# Patient Record
Sex: FEMALE | Race: WHITE | NOT HISPANIC OR LATINO | URBAN - METROPOLITAN AREA
[De-identification: names, ages, dates, MRNs, and addresses within clinical notes are randomized per-mention and may not be internally consistent; named-entity substitution may affect disease eponyms.]

---

## 2022-04-12 ENCOUNTER — PRE-PROCEDURE SCREENING (OUTPATIENT)
Dept: GASTROENTEROLOGY | Facility: CLINIC | Age: 47
End: 2022-04-12

## 2022-04-12 NOTE — TELEPHONE ENCOUNTER
Colonoscopy screening--No personal history  of polyps--Family history of polyps (mother)--Family history of colon cancer-- (grandmother)--No blood thinners--Medications:          Losartan  Sertraline               Pt verbalized and understood that it would be few weeks before been schedule            Pt will call back with update insurance

## 2022-04-13 ENCOUNTER — PREP FOR SURGERY (OUTPATIENT)
Dept: OTHER | Facility: HOSPITAL | Age: 47
End: 2022-04-13

## 2022-04-13 DIAGNOSIS — Z12.11 SCREENING FOR MALIGNANT NEOPLASM OF COLON: Primary | ICD-10-CM

## 2022-04-13 RX ORDER — SODIUM CHLORIDE, SODIUM LACTATE, POTASSIUM CHLORIDE, CALCIUM CHLORIDE 600; 310; 30; 20 MG/100ML; MG/100ML; MG/100ML; MG/100ML
30 INJECTION, SOLUTION INTRAVENOUS CONTINUOUS
Status: CANCELLED | OUTPATIENT
Start: 2022-04-13

## 2022-05-18 ENCOUNTER — TELEPHONE (OUTPATIENT)
Dept: GASTROENTEROLOGY | Facility: CLINIC | Age: 47
End: 2022-05-18

## 2022-05-18 NOTE — TELEPHONE ENCOUNTER
Received call from patient states she hasn't heard back to complete questionnaire.  Completed over the phone with her and put in Angelica box to be processed to Dr Bhakta.

## 2022-06-01 ENCOUNTER — TELEPHONE (OUTPATIENT)
Dept: GASTROENTEROLOGY | Facility: CLINIC | Age: 47
End: 2022-06-01

## 2022-06-01 ENCOUNTER — PREP FOR SURGERY (OUTPATIENT)
Dept: OTHER | Facility: HOSPITAL | Age: 47
End: 2022-06-01

## 2022-06-01 DIAGNOSIS — Z83.71 FAMILY HISTORY OF POLYPS IN THE COLON: Primary | ICD-10-CM

## 2022-06-01 PROBLEM — Z83.719 FAMILY HISTORY OF POLYPS IN THE COLON: Status: ACTIVE | Noted: 2022-06-01

## 2022-06-01 NOTE — TELEPHONE ENCOUNTER
belen Wilkes for 8/19 arrive at 915/,  mailing out prep inst on 6/1 advised pt time is subject to change [facility] will call.

## 2022-08-19 ENCOUNTER — TELEPHONE (OUTPATIENT)
Dept: GASTROENTEROLOGY | Facility: CLINIC | Age: 47
End: 2022-08-19

## 2022-08-19 NOTE — TELEPHONE ENCOUNTER
RAHEEL patient via telephone for   . Scheduled 11/4/22 with arrival time of 9:06 am . Prep paperwork mailed to verified address on file. Patient advised arrival time may change based on [facility] guidelines. RAHEEL QUEVEDO

## 2022-08-19 NOTE — TELEPHONE ENCOUNTER
Caller: Audrey Rodriguez    Relationship to patient: Self    Best call back number: [phone]   Chief complaint: COVID     Type of visit: COLONOSCOPY    Requested date: NEXT AVAILABLE     If rescheduling, when is the original appointment:08/19/22     Additional notes:  PATIENT CALLED IN ASKING TO RESCHEDULE COLONOSCOPY DUE TO COVID.

## 2022-10-24 ENCOUNTER — TELEPHONE (OUTPATIENT)
Dept: GASTROENTEROLOGY | Facility: CLINIC | Age: 47
End: 2022-10-24

## 2022-10-24 NOTE — TELEPHONE ENCOUNTER
Caller: Audrey Rodriguez    Relationship to patient: Self    Best call back number: [phone]    Chief complaint: SCREENING    Type of visit: COLONOSCOPY     Requested date: NEXT AVAILABLE ON A Friday OR Monday.    If rescheduling, when is the original appointment: 11/4/2022    Additional notes: PT WILL HAVE NOT HAVE A RIDE FOR 11/4.

## 2022-10-27 NOTE — TELEPHONE ENCOUNTER
RAHEEL Clarke for COLONOSCOPY  on 12/07/22 arrive at 10am.Prep instructions given to pt at office.

## 2022-12-06 ENCOUNTER — TELEPHONE (OUTPATIENT)
Dept: GASTROENTEROLOGY | Facility: CLINIC | Age: 47
End: 2022-12-06

## 2022-12-06 NOTE — TELEPHONE ENCOUNTER
Caller: Audrey Rodriguez    Relationship to patient: Self    Best call back number: [phone]    Patient is needing: PATIENT HAS COLONOSCOPY TOMORROW WITH DR WILEY. PATIENTS FAMILY IS SICK WITH THE FLU AND SHE IS STARTING TO NOT FEEL WELL. SHE WOULD LIKE A CALLBACK TO RESCHEDULE APPT.

## 2022-12-09 NOTE — TELEPHONE ENCOUNTER
RAHEEL Wilkes for COLONOSCOPY  on 1/20/23 arrive at 830am.Prep instructions mailed to address on file.

## 2023-01-16 ENCOUNTER — TELEPHONE (OUTPATIENT)
Dept: GASTROENTEROLOGY | Facility: CLINIC | Age: 48
End: 2023-01-16

## 2023-01-16 ENCOUNTER — TELEPHONE (OUTPATIENT)
Dept: GASTROENTEROLOGY | Facility: CLINIC | Age: 48
End: 2023-01-16
Payer: COMMERCIAL

## 2023-01-16 NOTE — TELEPHONE ENCOUNTER
OK FOR HUB TO READ  RAHEEL patient via telephone for COLONOSCOPY. Scheduled 2/117/23 with arrival time of 0730AM. Prep paperwork mailed to verified address on file. Patient advised arrival time may change based on [facility] guidelines. RAHEEL RUGGIERO

## 2023-01-16 NOTE — TELEPHONE ENCOUNTER
Caller: Audrey Rodriguez    Relationship to patient: Self    Best call back number: [phone]    Patient is needing: PATIENT IS NEEDING TO RESCHEDULE SCOPE, PREFERS Friday'S AND ANYTIME AFTER  02/17/23.

## 2023-01-16 NOTE — TELEPHONE ENCOUNTER
Caller: Audrey Rodriguez    Relationship to patient: Self    Best call back number: [phone]    Type of visit: RESCHEDULE COLONOSCOPY.    Requested date: NEXT AVAILABLE     If rescheduling, when is the original appointment: 1/20/23    Additional notes:PATIENT CALLED IN ASKING TO RESCHEDULE COLONOSCOPY.

## 2023-01-17 ENCOUNTER — TELEPHONE (OUTPATIENT)
Dept: GASTROENTEROLOGY | Facility: CLINIC | Age: 48
End: 2023-01-17
Payer: COMMERCIAL

## 2023-01-17 NOTE — TELEPHONE ENCOUNTER
OK FOR HUB TO READ  RAHEEL patient via telephone for COLONOSCOPY. Scheduled 2/24/23 with arrival time of 1030AM. Prep paperwork mailed to verified address on file. Patient advised arrival time may change based on [facility] guidelines. RAHEEL RUGGIERO

## 2023-02-20 ENCOUNTER — TELEPHONE (OUTPATIENT)
Dept: GASTROENTEROLOGY | Facility: CLINIC | Age: 48
End: 2023-02-20

## 2023-02-20 NOTE — TELEPHONE ENCOUNTER
Caller: Audrey Rodriguez    Relationship to patient: Self    Best call back number: [phone]    Type of visit: COLONOSCOPY    Requested date: SOMETIME IN APRIL    If rescheduling, when is the original appointment: 2/24/23    Additional notes:PT HAS ANOTHER PROCEDURE THIS WEEK AND NEEDS TO RESCHEDULE. CALL BACK ANYTIME AND OK TO [facility] and the [facility].

## 2023-02-21 ENCOUNTER — TELEPHONE (OUTPATIENT)
Dept: GASTROENTEROLOGY | Facility: CLINIC | Age: 48
End: 2023-02-21
Payer: COMMERCIAL

## 2023-04-04 ENCOUNTER — TELEPHONE (OUTPATIENT)
Dept: GASTROENTEROLOGY | Facility: CLINIC | Age: 48
End: 2023-04-04

## 2023-04-04 NOTE — TELEPHONE ENCOUNTER
Hub staff attempted to follow warm transfer process and was unsuccessful     Caller: Audrey Rodriguez    Relationship to patient: Self    Best call back number: [phone]    Patient is needing: PATIENT STATES THAT SHE HAS HAD TO RESCHEDULE HER SCOPE MULTIPLE TIMES AND BEFORE SHE RESCHEDULES AGAIN SHE WANTS TO KNOW IF DR WILEY WOULD WANT TO SEE HER IN THE OFFICE FIRST; SHE STATES THAT SHE HAS HAD HEMORRHOIDS OFF AND ON AND THAT RECENTLY WHEN SHE HAS BEEN WIPING AFTER A BOWEL MOVEMENT, SHE IS SEEING BRIGHT RED BLOOD. PLEASE CALL PATIENT BACK TO ADVISE.

## 2023-04-21 ENCOUNTER — TELEPHONE (OUTPATIENT)
Dept: GASTROENTEROLOGY | Facility: CLINIC | Age: 48
End: 2023-04-21

## 2023-06-14 ENCOUNTER — OFFICE VISIT (OUTPATIENT)
Dept: GASTROENTEROLOGY | Facility: CLINIC | Age: 48
End: 2023-06-14
Payer: COMMERCIAL

## 2023-06-14 VITALS
SYSTOLIC BLOOD PRESSURE: 128 MMHG | BODY MASS INDEX: 21.76 KG/M2 | DIASTOLIC BLOOD PRESSURE: 81 MMHG | HEIGHT: 66 IN | TEMPERATURE: 97.3 F | OXYGEN SATURATION: 100 % | HEART RATE: 78 BPM | WEIGHT: 135.4 LBS

## 2023-06-14 DIAGNOSIS — K59.00 CONSTIPATION, UNSPECIFIED CONSTIPATION TYPE: Primary | ICD-10-CM

## 2023-06-14 PROCEDURE — 99204 OFFICE O/P NEW MOD 45 MIN: CPT | Performed by: INTERNAL MEDICINE

## 2023-06-14 RX ORDER — LOSARTAN POTASSIUM 50 MG/1
1 TABLET ORAL EVERY 12 HOURS SCHEDULED
COMMUNITY
Start: 2023-04-17

## 2023-06-14 RX ORDER — SERTRALINE HYDROCHLORIDE 100 MG/1
1 TABLET, FILM COATED ORAL DAILY
COMMUNITY
Start: 2023-04-17

## 2023-06-14 RX ORDER — DIPHENOXYLATE HYDROCHLORIDE AND ATROPINE SULFATE 2.5; .025 MG/1; MG/1
TABLET ORAL
COMMUNITY

## 2023-06-14 RX ORDER — DIMENHYDRINATE 50 MG
1 TABLET ORAL
COMMUNITY

## 2023-06-14 RX ORDER — SODIUM CHLORIDE, SODIUM LACTATE, POTASSIUM CHLORIDE, CALCIUM CHLORIDE 600; 310; 30; 20 MG/100ML; MG/100ML; MG/100ML; MG/100ML
30 INJECTION, SOLUTION INTRAVENOUS CONTINUOUS
OUTPATIENT
Start: 2023-06-14

## 2023-06-14 RX ORDER — VITAMIN B COMPLEX
1 CAPSULE ORAL
COMMUNITY

## 2023-06-14 RX ORDER — DAPSONE 50 MG/G
GEL TOPICAL
COMMUNITY
Start: 2023-03-01

## 2023-06-14 NOTE — PROGRESS NOTES
Chief Complaint   Patient presents with    Constipation    Hematochezia    Abdominal Cramping     Subjective   HPI  Audrey Rodriguez is a 48 y.o. female who presents today for new patient evaluation.    She is here to discuss need for screening colonoscopy.  She has a family history of colon cancer in her maternal grandmother and polyps in her mother.  She reports she had a few episodes of red blood noted on the tissue with wiping over the last few weeks.  She did have a few weeks of transient constipation preceding this.  Feels like her bowels are currently at baseline.  No prior colonoscopy.    There is some question of a history of von Willebrand's disease which was brought up after she had an episode of bleeding 2 weeks after tonsillectomy at the age of 20.  She tells me she had a's extensive evaluation by hematology about 2 years ago and was not found to have any specific bleeding disorder.      Objective   Vitals:    06/14/23 0819   BP: 128/81   Pulse: 78   Temp: 97.3 °F (36.3 °C)   SpO2: 100%     Physical Exam  Vitals reviewed.   Constitutional:       Appearance: She is well-developed.   HENT:      Head: Normocephalic and atraumatic.   Abdominal:      General: Bowel sounds are normal. There is no distension.      Palpations: Abdomen is soft. There is no mass.      Tenderness: There is no abdominal tenderness.      Hernia: No hernia is present.   Skin:     General: Skin is warm and dry.   Neurological:      Mental Status: She is alert and oriented to person, place, and time.   Psychiatric:         Behavior: Behavior normal.         Thought Content: Thought content normal.         Judgment: Judgment normal.            Assessment & Plan   Assessment:     1. Constipation, unspecified constipation type    2.      Family hx of colon cancer    Plan:   Case request for colonoscopy submitted  High fiber diet and as needed stool softener        Franck Malcolm M.D.  [facility]  [address]  - [address] [place] [address]  Office: [phone]

## 2023-07-31 ENCOUNTER — TELEPHONE (OUTPATIENT)
Dept: GASTROENTEROLOGY | Facility: CLINIC | Age: 48
End: 2023-07-31
Payer: COMMERCIAL

## 2023-08-18 ENCOUNTER — TELEPHONE (OUTPATIENT)
Dept: GASTROENTEROLOGY | Facility: CLINIC | Age: 48
End: 2023-08-18
Payer: COMMERCIAL

## 2023-08-18 ENCOUNTER — PREP FOR SURGERY (OUTPATIENT)
Dept: OTHER | Facility: HOSPITAL | Age: 48
End: 2023-08-18
Payer: COMMERCIAL

## 2023-08-18 DIAGNOSIS — K59.00 CONSTIPATION, UNSPECIFIED CONSTIPATION TYPE: Primary | ICD-10-CM

## 2023-08-18 NOTE — TELEPHONE ENCOUNTER
Caller: Audrey Rodriguez    Relationship: Self    Best call back number: [phone]     What is the best time to reach you: ASAP    Who are you requesting to speak with (clinical staff, provider,  specific staff member): CLINICAL     What was the call regarding: PATIENT'S PCP IS RECOMMENDING THAT PATIENT HAS EGD. SHE MENTIONED HER PCP HAS SCHEDULED A BARIUM SWALLOW TO LOOK FOR AN ULCER. PATIENT IS WANTING TO SEE IF THIS CAN BE DONE IN ADDITION TO HER COLONOSCOPY ON 8/29/23, SO SHE DOESN'T HAVE TO HAVE TWO SEPARATE SCOPES. PLEASE CALL BACK ASAP TO ADVISE.

## 2023-08-18 NOTE — TELEPHONE ENCOUNTER
Called patient and she stated she has been having upper right quadrant pain for over a year now. It would come and go. Patient did have barium swallow study but cancelled because pain went away. Patient states for the last month or so it has come back with gastric symptoms of gas and burping and nausea. Patient states in the last week it has gotten worse and she is waking up in the middle of the night with right upper quad pain and nausea.    Patient is asking can she add and EGD to colonoscopy or does she need to do the barium study first

## 2023-08-18 NOTE — TELEPHONE ENCOUNTER
Franck Malcolm MD Stowers, Sharon G, RN  Caller: Unspecified (Today,  8:54 AM)  Yes please add EGD to colonoscopy

## 2023-08-21 ENCOUNTER — TELEPHONE (OUTPATIENT)
Dept: GASTROENTEROLOGY | Facility: CLINIC | Age: 48
End: 2023-08-21
Payer: COMMERCIAL

## 2023-08-28 ENCOUNTER — TELEPHONE (OUTPATIENT)
Dept: GASTROENTEROLOGY | Facility: CLINIC | Age: 48
End: 2023-08-28

## 2023-08-28 NOTE — TELEPHONE ENCOUNTER
Hub staff attempted to follow warm transfer process and was unsuccessful     Caller: Audrey Rodriguez    Relationship to patient: Self    Best call back number: [phone]    Patient is needing: PATIENT STATED THAT THERE WAS A FAMILY EMERGENCY THAT CAME UP AND SHE IS NEEDING TO TAE HER SON TO AN APPT TODAY. SHE STATED THAT SHE IS HAVING AN EGD AND C/S TOMORROW, BUT HER TAKING HER SON TO THIS APPT IS GOING TO INTERRUPT DOING HER PREP. SHE STATED THAT SHE COULD STILL DO THE EGD, BUT SHE WOULD NEED TO RESCHEDULE HER C/S, OR DOES SHE JUST NEED TO RESCHEDULE BOTH?

## 2023-09-18 ENCOUNTER — TELEPHONE (OUTPATIENT)
Dept: GASTROENTEROLOGY | Facility: CLINIC | Age: 48
End: 2023-09-18
Payer: COMMERCIAL

## 2023-09-19 RX ORDER — CHLORAL HYDRATE 500 MG
1000 CAPSULE ORAL
COMMUNITY

## 2023-09-21 ENCOUNTER — HOSPITAL ENCOUNTER (OUTPATIENT)
Facility: HOSPITAL | Age: 48
Setting detail: HOSPITAL OUTPATIENT SURGERY
Discharge: HOME | End: 2023-09-21
Attending: INTERNAL MEDICINE | Admitting: INTERNAL MEDICINE
Payer: COMMERCIAL

## 2023-09-21 ENCOUNTER — ANESTHESIA EVENT (OUTPATIENT)
Dept: GASTROENTEROLOGY | Facility: HOSPITAL | Age: 48
End: 2023-09-21
Payer: COMMERCIAL

## 2023-09-21 ENCOUNTER — ANESTHESIA (OUTPATIENT)
Dept: GASTROENTEROLOGY | Facility: HOSPITAL | Age: 48
End: 2023-09-21
Payer: COMMERCIAL

## 2023-09-21 VITALS
HEIGHT: 65 IN | RESPIRATION RATE: 23 BRPM | DIASTOLIC BLOOD PRESSURE: 83 MMHG | OXYGEN SATURATION: 100 % | HEART RATE: 70 BPM | SYSTOLIC BLOOD PRESSURE: 115 MMHG | WEIGHT: 131.3 LBS | BODY MASS INDEX: 21.88 KG/M2

## 2023-09-21 DIAGNOSIS — K59.00 CONSTIPATION, UNSPECIFIED CONSTIPATION TYPE: ICD-10-CM

## 2023-09-21 LAB
B-HCG UR QL: NEGATIVE
EXPIRATION DATE: NORMAL
INTERNAL NEGATIVE CONTROL: NORMAL
INTERNAL POSITIVE CONTROL: NORMAL
Lab: NORMAL

## 2023-09-21 PROCEDURE — 88305 TISSUE EXAM BY PATHOLOGIST: CPT | Performed by: INTERNAL MEDICINE

## 2023-09-21 PROCEDURE — 81025 URINE PREGNANCY TEST: CPT | Performed by: INTERNAL MEDICINE

## 2023-09-21 PROCEDURE — 45385 COLONOSCOPY W/LESION REMOVAL: CPT | Performed by: INTERNAL MEDICINE

## 2023-09-21 PROCEDURE — 25010000002 PROPOFOL 10 MG/ML EMULSION: Performed by: ANESTHESIOLOGY

## 2023-09-21 PROCEDURE — 43239 EGD BIOPSY SINGLE/MULTIPLE: CPT | Performed by: INTERNAL MEDICINE

## 2023-09-21 RX ORDER — SODIUM CHLORIDE, SODIUM LACTATE, POTASSIUM CHLORIDE, CALCIUM CHLORIDE 600; 310; 30; 20 MG/100ML; MG/100ML; MG/100ML; MG/100ML
30 INJECTION, SOLUTION INTRAVENOUS CONTINUOUS
Status: DISCONTINUED | OUTPATIENT
Start: 2023-09-21 | End: 2023-09-21 | Stop reason: HOSPADM

## 2023-09-21 RX ORDER — LIDOCAINE HYDROCHLORIDE 20 MG/ML
INJECTION, SOLUTION INFILTRATION; PERINEURAL AS NEEDED
Status: DISCONTINUED | OUTPATIENT
Start: 2023-09-21 | End: 2023-09-21 | Stop reason: SURG

## 2023-09-21 RX ORDER — PROPOFOL 10 MG/ML
VIAL (ML) INTRAVENOUS AS NEEDED
Status: DISCONTINUED | OUTPATIENT
Start: 2023-09-21 | End: 2023-09-21 | Stop reason: SURG

## 2023-09-21 RX ADMIN — PROPOFOL 80 MG: 10 INJECTION, EMULSION INTRAVENOUS at 12:10

## 2023-09-21 RX ADMIN — SODIUM CHLORIDE, POTASSIUM CHLORIDE, SODIUM LACTATE AND CALCIUM CHLORIDE 30 ML/HR: 600; 310; 30; 20 INJECTION, SOLUTION INTRAVENOUS at 11:50

## 2023-09-21 RX ADMIN — LIDOCAINE HYDROCHLORIDE 60 MG: 20 INJECTION, SOLUTION INFILTRATION; PERINEURAL at 12:10

## 2023-09-21 RX ADMIN — PROPOFOL 100 MCG/KG/MIN: 10 INJECTION, EMULSION INTRAVENOUS at 12:17

## 2023-09-21 NOTE — ANESTHESIA PREPROCEDURE EVALUATION
Anesthesia Evaluation     history of anesthetic complications:  PONV               Airway   Mallampati: III  TM distance: >3 FB  Neck ROM: full  No difficulty expected  Dental - normal exam     Pulmonary    Cardiovascular     (+) hypertension      Neuro/Psych  GI/Hepatic/Renal/Endo    (+) renal disease    Musculoskeletal     Abdominal    Substance History      OB/GYN          Other                      Anesthesia Plan    ASA 3     MAC       Anesthetic plan, risks, benefits, and alternatives have been provided, discussed and informed consent has been obtained with: patient.    CODE STATUS:

## 2023-09-21 NOTE — ANESTHESIA POSTPROCEDURE EVALUATION
Patient: Audrey Rodriguez    Procedure Summary       Date: 09/21/23 Room / Location: [facility] ENDOSCOPY 10 /  [facility]    Anesthesia Start: 1209 Anesthesia Stop: 1245    Procedures:       ESOPHAGOGASTRODUODENOSCOPY with biopsies (Esophagus)      COLONOSCOPY to the cecum Diagnosis:       Constipation, unspecified constipation type      (Constipation, unspecified constipation type [K59.00])    Surgeons: Franck Malcolm MD Provider: Mary Martinez MD    Anesthesia Type: MAC ASA Status: 3            Anesthesia Type: MAC    Vitals  No vitals data found for the desired time range.          Post Anesthesia Care and Evaluation    Patient location during evaluation: bedside  Patient participation: complete - patient participated  Level of consciousness: awake  Pain management: adequate    Airway patency: patent  Anesthetic complications: No anesthetic complications    Cardiovascular status: acceptable  Respiratory status: acceptable  Hydration status: acceptable

## 2023-09-21 NOTE — H&P
"[facility]  Pre Procedure History & Physical    Chief Complaint:   Abdominal pain  Screening colonoscopy    Subjective     HPI:   48 y.o. female here for EGD/colonoscopy for above issues           Past Medical History:   Past Medical History:   Diagnosis Date    Hypertension     Polycystic kidney disease     PONV (postoperative nausea and vomiting)        Past Surgical History:  Past Surgical History:   Procedure Laterality Date    TONSILLECTOMY Bilateral        Family History:  Family History   Problem Relation Age of Onset    Malig Hyperthermia Neg Hx        Social History:   reports that she does not use drugs.    Medications:   Medications Prior to Admission   Medication Sig Dispense Refill Last Dose    Acetylcysteine (NAC PO) Take  by mouth.       b complex vitamins capsule Take 1 capsule by mouth.       Dapsone 5 % topical gel        Flaxseed, Linseed, (Flax Seed Oil) 1000 MG capsule Take 1 capsule by mouth.       losartan (COZAAR) 50 MG tablet Take 1 tablet by mouth Daily.       multivitamin (THERAGRAN) tablet tablet Take  by mouth.       Omega-3 Fatty Acids (fish oil) 1000 MG capsule capsule Take 1 capsule by mouth Daily With Breakfast.       sertraline (ZOLOFT) 100 MG tablet Take 1 tablet by mouth Daily.          Allergies:  Aspirin, Iodinated contrast media, Latex, Nsaids, Amoxicillin-pot clavulanate, Cephalexin, Penicillins, and Sulfa antibiotics    ROS:    Pertinent items are noted in HPI     Objective     Height 165.1 cm (65\"), weight 59.6 kg (131 lb 4.8 oz).    Physical Exam   Constitutional: Pt is oriented to person, place, and time and well-developed, well-nourished, and in no distress.   Mouth/Throat: Oropharynx is clear and moist.   Neck: Normal range of motion.   Cardiovascular: Normal rate, regular rhythm and normal heart sounds.    Pulmonary/Chest: Effort normal and breath sounds normal.   Abdominal: Soft. Nontender  Skin: Skin is warm and dry.   Psychiatric: Mood, " memory, affect and judgment normal.     Assessment & Plan     Diagnosis:  Abdominal pain  Screening for colonoscopy    Anticipated Surgical Procedure:  EGD  Colonoscopy    The risks, benefits, and alternatives of this procedure have been discussed with the patient or the responsible party- the patient understands and agrees to proceed.

## 2023-09-22 LAB
LAB AP CASE REPORT: NORMAL
PATH REPORT.FINAL DX SPEC: NORMAL
PATH REPORT.GROSS SPEC: NORMAL

## 2023-10-05 ENCOUNTER — TELEPHONE (OUTPATIENT)
Dept: GASTROENTEROLOGY | Facility: CLINIC | Age: 48
End: 2023-10-05
Payer: COMMERCIAL

## 2023-10-05 NOTE — TELEPHONE ENCOUNTER
Call to patient per Dr. Malcolm results and recommendations.   Patient verbalized understanding     HM and CS recall have been completed for 07/21/2028     ----- Message from Franck Malcolm MD sent at 10/5/2023  1:30 PM EDT -----  Benign EGD bx  Benign colon polyp - recall 5 years

## 2025-01-09 ENCOUNTER — APPOINTMENT (OUTPATIENT)
Dept: WOMENS IMAGING | Facility: HOSPITAL | Age: 50
End: 2025-01-09
Payer: COMMERCIAL

## 2025-01-09 PROCEDURE — 77065 DX MAMMO INCL CAD UNI: CPT | Performed by: RADIOLOGY

## 2025-01-09 PROCEDURE — 76642 ULTRASOUND BREAST LIMITED: CPT | Performed by: RADIOLOGY

## 2025-01-09 PROCEDURE — 77061 BREAST TOMOSYNTHESIS UNI: CPT | Performed by: RADIOLOGY

## 2025-01-09 PROCEDURE — G0279 TOMOSYNTHESIS, MAMMO: HCPCS | Performed by: RADIOLOGY

## 2025-01-21 ENCOUNTER — APPOINTMENT (OUTPATIENT)
Dept: WOMENS IMAGING | Facility: HOSPITAL | Age: 50
End: 2025-01-21
Payer: COMMERCIAL

## 2025-01-21 PROCEDURE — 19000 PUNCTURE ASPIR CYST BREAST: CPT | Performed by: RADIOLOGY

## 2025-01-21 PROCEDURE — 76942 ECHO GUIDE FOR BIOPSY: CPT | Performed by: RADIOLOGY

## 2025-03-21 ENCOUNTER — OFFICE VISIT (OUTPATIENT)
Dept: INTERNAL MEDICINE | Facility: CLINIC | Age: 50
End: 2025-03-21
Payer: COMMERCIAL

## 2025-03-21 VITALS
HEART RATE: 76 BPM | WEIGHT: 135.6 LBS | DIASTOLIC BLOOD PRESSURE: 76 MMHG | BODY MASS INDEX: 22.59 KG/M2 | SYSTOLIC BLOOD PRESSURE: 122 MMHG | OXYGEN SATURATION: 98 % | HEIGHT: 65 IN

## 2025-03-21 DIAGNOSIS — F41.0 GENERALIZED ANXIETY DISORDER WITH PANIC ATTACKS: ICD-10-CM

## 2025-03-21 DIAGNOSIS — Z86.0100 HISTORY OF COLON POLYPS: ICD-10-CM

## 2025-03-21 DIAGNOSIS — I10 HYPERTENSION, UNSPECIFIED TYPE: ICD-10-CM

## 2025-03-21 DIAGNOSIS — F41.1 GENERALIZED ANXIETY DISORDER WITH PANIC ATTACKS: ICD-10-CM

## 2025-03-21 DIAGNOSIS — Z11.59 ENCOUNTER FOR HEPATITIS C SCREENING TEST FOR LOW RISK PATIENT: ICD-10-CM

## 2025-03-21 DIAGNOSIS — J45.20 MILD INTERMITTENT ASTHMA WITHOUT COMPLICATION: ICD-10-CM

## 2025-03-21 DIAGNOSIS — Q61.3 POLYCYSTIC KIDNEY DISEASE: Primary | ICD-10-CM

## 2025-03-21 DIAGNOSIS — E78.5 HYPERLIPIDEMIA, UNSPECIFIED HYPERLIPIDEMIA TYPE: ICD-10-CM

## 2025-03-21 PROCEDURE — 99204 OFFICE O/P NEW MOD 45 MIN: CPT | Performed by: STUDENT IN AN ORGANIZED HEALTH CARE EDUCATION/TRAINING PROGRAM

## 2025-03-21 RX ORDER — ALBUTEROL SULFATE 90 UG/1
2 INHALANT RESPIRATORY (INHALATION) EVERY 4 HOURS PRN
Qty: 6.7 G | Refills: 0 | Status: SHIPPED | OUTPATIENT
Start: 2025-03-21

## 2025-03-21 RX ORDER — UBIDECARENONE 100 MG
100 CAPSULE ORAL DAILY
COMMUNITY

## 2025-03-21 RX ORDER — LOSARTAN POTASSIUM 100 MG/1
100 TABLET ORAL DAILY
Qty: 90 TABLET | Refills: 2 | Status: SHIPPED | OUTPATIENT
Start: 2025-03-21

## 2025-03-21 RX ORDER — ROSUVASTATIN CALCIUM 5 MG/1
1 TABLET, COATED ORAL DAILY
COMMUNITY
Start: 2025-03-06 | End: 2026-03-06

## 2025-03-21 RX ORDER — PROGESTERONE 200 MG/1
200 CAPSULE ORAL NIGHTLY
COMMUNITY
Start: 2025-01-08

## 2025-03-21 NOTE — PROGRESS NOTES
Harry Villanueva M.D.  Internal Medicine  [facility]  [address], [address]  [place] [address]  [phone]      Chief Complaint  Establish Care    SUBJECTIVE    History of Present Illness    Audrey Rodriguez is a 50 y.o. female with polycystic kidney disease who presents to the office today as a new patient to establish care.     History of Present Illness  She has polycystic kidney disease, which is being managed by a nephrologist at [facility]. Her kidney function is still normal. She was diagnosed at 19 through imaging that showed cysts, and later imaging has shown more cysts, including some on her liver. Her father also had this condition.    There is a family history of high cholesterol, with many family members starting statin therapy in their 30s. Her mother has been on a statin for years. Her cholesterol levels were normal last year, but due to her family history and blood pressure, her nephrologist started her on Crestor a week ago.  She also takes CoQ10. A calcium score test a year ago was 8.    She is experiencing perimenopause and started progesterone therapy, which has improved her symptoms but caused some weight gain. Her periods are regular but very heavy, which led to anemia that has improved with progesterone. She also had insomnia and anxiety due to low mid-cycle progesterone levels. She was prescribed testosterone cream but is not using it as her levels were low normal.    She was diagnosed with von Willebrand's disease in 1998 after a severe hemorrhage following a tonsillectomy at 20, but a reevaluation 2 years ago suggested it was a misdiagnosis.    She states she her mammograms usually come back abnormal and are followed by ultrasounds. Her last ultrasound led to a needle aspiration of a cyst, which resolved. She is concerned about her risk for breast cancer and is awaiting genetic testing results.    She has a new diagnosis of asthma and uses inhalers. Exposure to  guinea pigs and hay caused respiratory issues, leading to a virus and a severe cough. She was diagnosed with mild asthma and advised to use Symbicort during respiratory illnesses. She uses albuterol as needed. A recent chest x-ray was normal. She needs a refill for albuterol. Historically, she has lingering coughs with respiratory issues and has required nebulizer treatments for bronchitis.    She has been on Zoloft since having panic attacks after her tonsillectomy at 20. The panic attacks are less frequent now. She has undergone EMDR therapy and is considering weaning off Zoloft slowly.    She takes losartan 100 mg daily for high blood pressure, which might be related to her kidney condition. Her father had high blood pressure, and her mother has high blood pressure at 70. She needs a refill for losartan.    She is a never smoker and reports occasional alcohol use.  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  She has 5 children, 3 biological and 2 stepchildren.  She maintains an active lifestyle, exercising almost daily, enjoying outdoor activities such as hiking and walking, and practicing yoga and meditation.      Father had a fatal MI at 43 and high blood pressure. Maternal grandmother had a heart attack in her 40s and colon cancer. Mother has had many polyps but no cancers and has high blood pressure at 70. Family history of hyperlipidemia. Father had polycystic kidney disease. No family history of breast cancer on mother's side.        Review of Systems    Allergies   Allergen Reactions    Aspirin Other (See Comments)    Iodinated Contrast Media Other (See Comments)    Latex Unknown - Low Severity    Nsaids Other (See Comments)     Due to bleeding disorder    Amoxicillin-Pot Clavulanate Hives    Cephalexin Unknown - Low Severity, Hives and Other (See Comments)    Penicillins Unknown - Low Severity and Hives    Sulfa Antibiotics Hives     Headaches        Outpatient Medications Marked as Taking for the  3/21/25 encounter (Office Visit) with Harry Villanueva MD   Medication Sig Dispense Refill    Acetylcysteine (NAC PO) Take  by mouth.      b complex vitamins capsule Take 1 capsule by mouth.      coenzyme Q10 100 MG capsule Take 1 capsule by mouth Daily.      ELDERBERRY PO Take  by mouth.      losartan (COZAAR) 100 MG tablet Take 1 tablet by mouth Daily. 90 tablet 2    Omega-3 Fatty Acids (fish oil) 1000 MG capsule capsule Take 1 capsule by mouth Daily With Breakfast.      PROBIOTIC PRODUCT PO Take  by mouth.      Progesterone (PROMETRIUM) 200 MG capsule Take 1 capsule by mouth Every Night.      rosuvastatin (CRESTOR) 5 MG tablet Take 1 tablet by mouth Daily.      sertraline (ZOLOFT) 100 MG tablet Take 1 tablet by mouth Daily.      [DISCONTINUED] losartan (COZAAR) 50 MG tablet Take 2 tablets by mouth Daily.          Past Medical History:   Diagnosis Date    Hypertension     Polycystic kidney disease     PONV (postoperative nausea and vomiting)      Past Surgical History:   Procedure Laterality Date    COLONOSCOPY N/A 09/21/2023    Procedure: COLONOSCOPY to the cecum;  Surgeon: Franck Malcolm MD;  Location: [facility] ENDOSCOPY;  Service: Gastroenterology;  Laterality: N/A;  PRE-screening  POST-polyp; hemorrhoids    ENDOSCOPY N/A 09/21/2023    Procedure: ESOPHAGOGASTRODUODENOSCOPY with biopsies;  Surgeon: Franck Malcolm MD;  Location: [facility] ENDOSCOPY;  Service: Gastroenterology;  Laterality: N/A;  PRE-abdominal pain  POST-normal    TONSILLECTOMY Bilateral 1998    had bleeding after     Family History   Problem Relation Age of Onset    Colon cancer Mother     Heart attack Mother     Kidney disease Father         Polycystic kidney disease    Heart disease Father 43    Early death Father     Heart attack Maternal Grandmother     Cancer Maternal Grandfather     Malig Hyperthermia Neg Hx     reports that she has never smoked. She has never been exposed to tobacco smoke. She has never used smokeless tobacco. She  "reports current alcohol use. She reports that she does not use drugs.    OBJECTIVE    Vital Signs:   /76   Pulse 76   Ht 165.1 cm (65\")   Wt 61.5 kg (135 lb 9.6 oz)   SpO2 98%   BMI 22.57 kg/m²     Physical Exam  Constitutional:       Appearance: Normal appearance.   Cardiovascular:      Rate and Rhythm: Normal rate and regular rhythm.      Heart sounds: Normal heart sounds. No murmur heard.  Pulmonary:      Effort: Pulmonary effort is normal.      Breath sounds: Normal breath sounds.   Abdominal:      General: Abdomen is flat. There is no distension.      Palpations: Abdomen is soft.      Tenderness: There is no abdominal tenderness.   Skin:     General: Skin is warm and dry.   Neurological:      Mental Status: She is alert.   Psychiatric:         Mood and Affect: Mood normal.         Behavior: Behavior normal.         Thought Content: Thought content normal.          Physical Exam      The following data was reviewed by: Harry Villanueva MD on 03/21/2025:            Data reviewed : Nephrology note          DATE: 03/06/2024     HISTORY: Screening for ischemic heart disease. History of polycystic   kidney disease     COMPARISON: None.     TECHNIQUE:  A noncontrast CT examination of the heart was performed   using a calcium scoring protocol.     FINDINGS:     CALCIUM SCORING:   Left main coronary artery: 0.0   Left anterior descending coronary artery: 8.0   Left Circumflex coronary artery: 0.0   Right coronary artery: 0.0   .   Total Calcium Scoring (Agatston Score): 8.0   .   The coronary calcifications involve the proximal left anterior   descending coronary artery (the [employer] software incorrectly labeled   as the left main coronary artery).     No pericardial effusion. There are numerous hepatic cysts and there is   an additional left retroperitoneal cyst which are incompletely   evaluated but consistent with history of polycystic kidney disease.     No suspicious nodules or masses of the visualized lungs. "   .   IMPRESSION:     1. The total Agatston Score is 8.0. This corresponds to Low risk of   future coronary events (scores of 1-100). Calcium score should be   interpreted with the patient's clinical risk factors.     2. Polycystic kidney disease with numerous cysts of the liver and cyst   of the left retroperitoneum, incompletely evaluated.       Last Chol 162 LDL 94 HDL 41 3/21/23 per nephrology note    ASSESSMENT & PLAN        Polycystic kidney disease  Following with Dr. Carreno yearly  Orders:    Basic Metabolic Panel    Hypertension, unspecified type  Hypertension is stable and controlled  Continue current treatment regimen.  On losartan which was recently increased by her nephrologist to 100 mg  Orders:    Basic Metabolic Panel    Hyperlipidemia, unspecified hyperlipidemia type  -On 5 mg Crestor which was recently started by nephrology due to family history of heart disease  -Calcium score previously 8 in 2024  -Cholesterol previously normal on blood work  -Discussed as long as she is tolerating it is probably okay to continue but she probably does not need to be on statin right now.  She could consider seeing cardiology if she is wondering about additional risk stratification other than calcium score    No statin recommended because 10-year risk <5%; always encourage healthy cardiovascular lifestyle choices. Some patients with other high risk features may still be appropriate for treatment.  1.2%  Risk of cardiovascular event (coronary or stroke death or non-fatal MI or stroke) in next 10 years.  0.8%  10-year cardiovascular risk if risk factors were optimal.      Orders:    Lipid Panel With LDL / HDL Ratio    Encounter for hepatitis C screening test for low risk patient    Orders:    HCV Antibody Rfx To Qnt PCR    Generalized anxiety disorder with panic attacks  Psychological condition is stable.  Continue current treatment regimen.  On Zoloft for years.  If she wanted to wean off it would need to be  gradual.       Mild intermittent asthma without complication  On albuterol as needed.    Orders:    albuterol sulfate  (90 Base) MCG/ACT inhaler; Inhale 2 puffs Every 4 (Four) Hours As Needed for Shortness of Air or Wheezing.    History of colon polyps  - Colonoscopy in 2023 with one benign polyp removed  - Next colonoscopy due in 2028          Assessment & Plan        Health Maintenance Due   Topic Date Due    LIPID PANEL  Never done    TDAP/TD VACCINES (2 - Td or Tdap) 12/03/2021    HEPATITIS C SCREENING  Never done    ANNUAL PHYSICAL  Never done    PAP SMEAR  Never done    COVID-19 Vaccine (5 - 2024-25 season) 09/01/2024        Follow Up  Return in about 6 months (around 9/21/2025) for Annual physical.    Patient/family had no further questions at this time and verbalized understanding of the plan discussed today.     Patient or patient representative verbalized consent for the use of Ambient Listening during the visit with  Harry Villanueva MD for chart documentation. 3/21/2025  12:10 EDT

## 2025-03-29 LAB
BUN SERPL-MCNC: 14 MG/DL (ref 6–24)
BUN/CREAT SERPL: 15 (ref 9–23)
CALCIUM SERPL-MCNC: 9.5 MG/DL (ref 8.7–10.2)
CHLORIDE SERPL-SCNC: 108 MMOL/L (ref 96–106)
CHOLEST SERPL-MCNC: 123 MG/DL (ref 100–199)
CO2 SERPL-SCNC: 22 MMOL/L (ref 20–29)
CREAT SERPL-MCNC: 0.94 MG/DL (ref 0.57–1)
EGFRCR SERPLBLD CKD-EPI 2021: 74 ML/MIN/1.73
GLUCOSE SERPL-MCNC: 84 MG/DL (ref 70–99)
HCV AB SERPL QL IA: NON REACTIVE
HCV AB SERPL QL IA: NORMAL
HDLC SERPL-MCNC: 44 MG/DL
LDLC SERPL CALC-MCNC: 67 MG/DL (ref 0–99)
LDLC/HDLC SERPL: 1.5 RATIO (ref 0–3.2)
POTASSIUM SERPL-SCNC: 4.4 MMOL/L (ref 3.5–5.2)
SODIUM SERPL-SCNC: 142 MMOL/L (ref 134–144)
TRIGL SERPL-MCNC: 50 MG/DL (ref 0–149)
VLDLC SERPL CALC-MCNC: 12 MG/DL (ref 5–40)

## 2025-06-25 ENCOUNTER — PATIENT MESSAGE (OUTPATIENT)
Dept: INTERNAL MEDICINE | Facility: CLINIC | Age: 50
End: 2025-06-25
Payer: COMMERCIAL

## 2025-06-26 RX ORDER — SERTRALINE HYDROCHLORIDE 100 MG/1
100 TABLET, FILM COATED ORAL DAILY
Qty: 90 TABLET | Refills: 2 | Status: SHIPPED | OUTPATIENT
Start: 2025-06-26

## 2025-06-26 RX ORDER — LOSARTAN POTASSIUM 50 MG/1
75 TABLET ORAL DAILY
Qty: 135 TABLET | Refills: 2 | Status: SHIPPED | OUTPATIENT
Start: 2025-06-26

## 2025-08-05 ENCOUNTER — OFFICE VISIT (OUTPATIENT)
Dept: INTERNAL MEDICINE | Facility: CLINIC | Age: 50
End: 2025-08-05
Payer: COMMERCIAL

## 2025-08-05 VITALS
BODY MASS INDEX: 22.49 KG/M2 | OXYGEN SATURATION: 98 % | HEART RATE: 62 BPM | SYSTOLIC BLOOD PRESSURE: 130 MMHG | DIASTOLIC BLOOD PRESSURE: 84 MMHG | HEIGHT: 65 IN | WEIGHT: 135 LBS

## 2025-08-05 DIAGNOSIS — N30.00 ACUTE CYSTITIS WITHOUT HEMATURIA: Primary | ICD-10-CM

## 2025-08-05 LAB
BILIRUB BLD-MCNC: NEGATIVE MG/DL
CLARITY, POC: CLEAR
COLOR UR: YELLOW
EXPIRATION DATE: ABNORMAL
GLUCOSE UR STRIP-MCNC: NEGATIVE MG/DL
KETONES UR QL: NEGATIVE
LEUKOCYTE EST, POC: ABNORMAL
Lab: ABNORMAL
NITRITE UR-MCNC: NEGATIVE MG/ML
PH UR: 6.5 [PH] (ref 5–8)
PROT UR STRIP-MCNC: NEGATIVE MG/DL
RBC # UR STRIP: ABNORMAL /UL
SP GR UR: 1.01 (ref 1–1.03)
UROBILINOGEN UR QL: ABNORMAL

## 2025-08-05 PROCEDURE — 81003 URINALYSIS AUTO W/O SCOPE: CPT | Performed by: INTERNAL MEDICINE

## 2025-08-05 PROCEDURE — 99213 OFFICE O/P EST LOW 20 MIN: CPT | Performed by: INTERNAL MEDICINE

## 2025-08-05 RX ORDER — CIPROFLOXACIN 500 MG/1
500 TABLET, FILM COATED ORAL 2 TIMES DAILY
Qty: 14 TABLET | Refills: 0 | Status: SHIPPED | OUTPATIENT
Start: 2025-08-05 | End: 2025-08-12

## 2025-08-07 LAB
BACTERIA UR CULT: NO GROWTH
BACTERIA UR CULT: NORMAL

## 2025-08-08 ENCOUNTER — PATIENT MESSAGE (OUTPATIENT)
Dept: INTERNAL MEDICINE | Facility: CLINIC | Age: 50
End: 2025-08-08
Payer: COMMERCIAL

## 2025-08-08 DIAGNOSIS — R35.0 URINARY FREQUENCY: Primary | ICD-10-CM

## (undated) DEVICE — FRCP BX RADJAW4 NDL 2.8 240CM LG OG BX40

## (undated) DEVICE — LN SMPL CO2 SHTRM SD STREAM W/M LUER

## (undated) DEVICE — SNAR POLYP CAPTIVATOR RND STFF 2.4 240CM 10MM 1P/U

## (undated) DEVICE — TUBING, SUCTION, 1/4" X 10', STRAIGHT: Brand: MEDLINE

## (undated) DEVICE — KT ORCA ORCAPOD DISP STRL

## (undated) DEVICE — SENSR O2 OXIMAX FNGR A/ 18IN NONSTR

## (undated) DEVICE — BLCK/BITE BLOX W/DENTL/RIM W/STRAP 54F

## (undated) DEVICE — ADAPT CLN BIOGUARD AIR/H2O DISP

## (undated) DEVICE — THE SINGLE USE ETRAP – POLYP TRAP IS USED FOR SUCTION RETRIEVAL OF ENDOSCOPICALLY REMOVED POLYPS.: Brand: ETRAP

## (undated) DEVICE — CANN O2 ETCO2 FITS ALL CONN CO2 SMPL A/ 7IN DISP LF